# Patient Record
Sex: FEMALE | Race: BLACK OR AFRICAN AMERICAN | Employment: FULL TIME | URBAN - METROPOLITAN AREA
[De-identification: names, ages, dates, MRNs, and addresses within clinical notes are randomized per-mention and may not be internally consistent; named-entity substitution may affect disease eponyms.]

---

## 2019-04-18 ENCOUNTER — OFFICE VISIT (OUTPATIENT)
Dept: FAMILY MEDICINE CLINIC | Age: 44
End: 2019-04-18
Payer: COMMERCIAL

## 2019-04-18 VITALS
OXYGEN SATURATION: 100 % | BODY MASS INDEX: 41.59 KG/M2 | RESPIRATION RATE: 12 BRPM | HEART RATE: 85 BPM | HEIGHT: 66 IN | SYSTOLIC BLOOD PRESSURE: 134 MMHG | DIASTOLIC BLOOD PRESSURE: 74 MMHG | WEIGHT: 258.8 LBS

## 2019-04-18 DIAGNOSIS — J30.2 SEASONAL ALLERGIES: ICD-10-CM

## 2019-04-18 DIAGNOSIS — N92.0 MENORRHAGIA WITH REGULAR CYCLE: ICD-10-CM

## 2019-04-18 DIAGNOSIS — E66.01 CLASS 3 SEVERE OBESITY DUE TO EXCESS CALORIES WITHOUT SERIOUS COMORBIDITY WITH BODY MASS INDEX (BMI) OF 40.0 TO 44.9 IN ADULT (HCC): ICD-10-CM

## 2019-04-18 DIAGNOSIS — E89.0 POST-SURGICAL HYPOTHYROIDISM: ICD-10-CM

## 2019-04-18 DIAGNOSIS — D50.0 IRON DEFICIENCY ANEMIA DUE TO CHRONIC BLOOD LOSS: ICD-10-CM

## 2019-04-18 DIAGNOSIS — Z00.00 HEALTHCARE MAINTENANCE: Primary | ICD-10-CM

## 2019-04-18 DIAGNOSIS — R06.83 SNORING: ICD-10-CM

## 2019-04-18 PROBLEM — D50.9 IRON DEFICIENCY ANEMIA: Status: ACTIVE | Noted: 2019-04-18

## 2019-04-18 PROCEDURE — 99386 PREV VISIT NEW AGE 40-64: CPT | Performed by: FAMILY MEDICINE

## 2019-04-18 RX ORDER — FLUTICASONE PROPIONATE 50 MCG
1 SPRAY, SUSPENSION (ML) NASAL
COMMUNITY

## 2019-04-18 RX ORDER — CETIRIZINE HYDROCHLORIDE 10 MG/1
10 TABLET ORAL
COMMUNITY

## 2019-04-18 RX ORDER — LEVOTHYROXINE SODIUM 175 UG/1
TABLET ORAL
Refills: 2 | COMMUNITY
Start: 2019-04-02

## 2019-04-18 ASSESSMENT — PATIENT HEALTH QUESTIONNAIRE - PHQ9
SUM OF ALL RESPONSES TO PHQ QUESTIONS 1-9: 0
2. FEELING DOWN, DEPRESSED OR HOPELESS: 0
SUM OF ALL RESPONSES TO PHQ QUESTIONS 1-9: 0
SUM OF ALL RESPONSES TO PHQ9 QUESTIONS 1 & 2: 0
1. LITTLE INTEREST OR PLEASURE IN DOING THINGS: 0

## 2019-04-18 NOTE — PROGRESS NOTES
Phyllistine So   YOB: 1975    Date of Visit:  4/18/2019        Allergies   Allergen Reactions    Penicillins Rash     Outpatient Medications Marked as Taking for the 4/18/19 encounter (Office Visit) with Jerod Nava MD   Medication Sig Dispense Refill    fluticasone (FLONASE) 50 MCG/ACT nasal spray 1 spray by Nasal route      levothyroxine (SYNTHROID) 175 MCG tablet TK 1 T PO  ON MONDAY THROUGH SATURDAY AND 1/2 T ON SUNDAYS  2    cetirizine (ZYRTEC) 10 MG tablet Take 10 mg by mouth      Ferrous Sulfate (SLOW FE PO) Take by mouth           Vitals:    04/18/19 0935   BP: 134/74   Site: Left Upper Arm   Position: Sitting   Cuff Size: Large Adult   Pulse: 85   Resp: 12   SpO2: 100%   Weight: 258 lb 12.8 oz (117.4 kg)   Height: 5' 6\" (1.676 m)     Body mass index is 41.77 kg/m². Wt Readings from Last 3 Encounters:   04/18/19 258 lb 12.8 oz (117.4 kg)     BP Readings from Last 3 Encounters:   04/18/19 134/74        Chief Complaint   Patient presents with    New Patient     est care     Annual Exam     not fasting        HPI    Edmund Chance presents to establish care. she has the following concerns today:    History of pre-cancer of her thyroid gland status post thyroidectomy 2012 with hypothyroidism: The patient has been stable on levothyroxine. Seeing endocrinology who manages this. Menorrhagia:  Seeing GYN. Has a uterine polyp and fibroid which are thought to be related to her bleeding. Planning to have polyp removed May 30. Dr. Xavier Mcnulty will do the surgery. Has progesterone to take prn when traveling and periods are heavy. Seasonal allergies: on zyrtec and flonase. Iron deficiency anemia: on iron. Managed by GYN. Thought 2/2 to periods. No rectal bleeding or other bleeding. Obesity:  Weight has fluctuated with her thyroid. Higher now then usual.     Tdap done in 2015. HM: Seeing GYN. S/p tubal ligation.      SH:  4/2019: Lives with  and 2 kids ages 6 and 13 - normal. Tympanic membrane is not injected and not bulging. Left Ear: External ear normal. Tympanic membrane is not injected and not bulging. Nose: Nose normal.   Mouth/Throat: Oropharynx is clear and moist and mucous membranes are normal. No oropharyngeal exudate. Eyes: Pupils are equal, round, and reactive to light. Lids are normal. Right eye exhibits no discharge. Left eye exhibits no discharge. Neck: Normal range of motion. Neck supple. No tracheal deviation present. No thyromegaly present. Cardiovascular: Normal rate, regular rhythm and normal heart sounds. No murmur heard. Pulmonary/Chest: Effort normal and breath sounds normal. No respiratory distress. Abdominal: Soft. Bowel sounds are normal. She exhibits no distension. There is no hepatosplenomegaly. There is no tenderness. Musculoskeletal: She exhibits no edema. Normal gait, normal muscle tone   Lymphadenopathy:     She has no cervical adenopathy. Neurological: She is alert. No cranial nerve deficit. Skin: Skin is warm and dry. No rash noted. Psychiatric: She has a normal mood and affect. Her behavior is normal. Judgment normal.         Assessment/Plan     1. Post-surgical hypothyroidism  Stable. Continue current regimen. Monitored by her endo. 2. Menorrhagia with regular cycle  Stable. Continue current regimen. Monitored by GYN. 3. Class 3 severe obesity due to excess calories without serious comorbidity with body mass index (BMI) of 40.0 to 44.9 in adult (HCC)  Stable. Counseled on lifestyle modifications including diet and exercise. 4. Iron deficiency anemia due to chronic blood loss  Stable. Continue current regimen. Per patient monitored by GYN - including CBC. 5. Seasonal allergies  Stable. Continue current regimen. 6. Healthcare maintenance  Annual physical exam done today. Counseled on preventative care and a healthy lifestlye. - Lipid Panel; Future  - Basic Metabolic Panel; Future    7. Snoring  Referral for sleep eval.   - Sarah Burks MD, Pul[facility], [facility]      Discussed medications with patient, who voiced understanding of their use and indications. All questions answered. Return in about 1 year (around 4/18/2020) for Physical or preop when needed. Kyaw Cardona

## 2019-04-19 DIAGNOSIS — Z00.00 HEALTHCARE MAINTENANCE: ICD-10-CM

## 2019-04-19 LAB
ANION GAP SERPL CALCULATED.3IONS-SCNC: 13 MMOL/L (ref 3–16)
BUN BLDV-MCNC: 13 MG/DL (ref 7–20)
CALCIUM SERPL-MCNC: 9.2 MG/DL (ref 8.3–10.6)
CHLORIDE BLD-SCNC: 106 MMOL/L (ref 99–110)
CHOLESTEROL, TOTAL: 147 MG/DL (ref 0–199)
CO2: 24 MMOL/L (ref 21–32)
CREAT SERPL-MCNC: 0.9 MG/DL (ref 0.6–1.1)
GFR AFRICAN AMERICAN: >60
GFR NON-AFRICAN AMERICAN: >60
GLUCOSE BLD-MCNC: 108 MG/DL (ref 70–99)
HDLC SERPL-MCNC: 47 MG/DL (ref 40–60)
LDL CHOLESTEROL CALCULATED: 84 MG/DL
POTASSIUM SERPL-SCNC: 4.6 MMOL/L (ref 3.5–5.1)
SODIUM BLD-SCNC: 143 MMOL/L (ref 136–145)
TRIGL SERPL-MCNC: 82 MG/DL (ref 0–150)
VLDLC SERPL CALC-MCNC: 16 MG/DL

## 2019-05-15 ENCOUNTER — TELEPHONE (OUTPATIENT)
Dept: FAMILY MEDICINE CLINIC | Age: 44
End: 2019-05-15

## 2019-05-16 ENCOUNTER — TELEPHONE (OUTPATIENT)
Dept: FAMILY MEDICINE CLINIC | Age: 44
End: 2019-05-16

## 2019-05-16 NOTE — TELEPHONE ENCOUNTER
No show letter was sent by accident for patient, Marcianne Meckel called pt and ask her to please disregard, comment added to letter also to disregard

## 2019-05-17 ENCOUNTER — OFFICE VISIT (OUTPATIENT)
Dept: FAMILY MEDICINE CLINIC | Age: 44
End: 2019-05-17
Payer: COMMERCIAL

## 2019-05-17 VITALS
SYSTOLIC BLOOD PRESSURE: 114 MMHG | OXYGEN SATURATION: 99 % | HEART RATE: 74 BPM | WEIGHT: 255.6 LBS | HEIGHT: 66 IN | DIASTOLIC BLOOD PRESSURE: 72 MMHG | BODY MASS INDEX: 41.08 KG/M2

## 2019-05-17 DIAGNOSIS — Z01.818 PRE-OPERATIVE GENERAL PHYSICAL EXAMINATION: ICD-10-CM

## 2019-05-17 DIAGNOSIS — N92.0 MENORRHAGIA WITH REGULAR CYCLE: ICD-10-CM

## 2019-05-17 DIAGNOSIS — R60.0 LOWER EXTREMITY EDEMA: ICD-10-CM

## 2019-05-17 DIAGNOSIS — R60.0 LOWER EXTREMITY EDEMA: Primary | ICD-10-CM

## 2019-05-17 LAB
ANION GAP SERPL CALCULATED.3IONS-SCNC: 12 MMOL/L (ref 3–16)
BUN BLDV-MCNC: 10 MG/DL (ref 7–20)
CALCIUM SERPL-MCNC: 9.1 MG/DL (ref 8.3–10.6)
CHLORIDE BLD-SCNC: 106 MMOL/L (ref 99–110)
CO2: 23 MMOL/L (ref 21–32)
CREAT SERPL-MCNC: 1 MG/DL (ref 0.6–1.1)
GFR AFRICAN AMERICAN: >60
GFR NON-AFRICAN AMERICAN: >60
GLUCOSE BLD-MCNC: 103 MG/DL (ref 70–99)
POTASSIUM SERPL-SCNC: 3.9 MMOL/L (ref 3.5–5.1)
PRO-BNP: 65 PG/ML (ref 0–124)
SODIUM BLD-SCNC: 141 MMOL/L (ref 136–145)

## 2019-05-17 PROCEDURE — 99243 OFF/OP CNSLTJ NEW/EST LOW 30: CPT | Performed by: NURSE PRACTITIONER

## 2019-05-17 PROCEDURE — G8417 CALC BMI ABV UP PARAM F/U: HCPCS | Performed by: NURSE PRACTITIONER

## 2019-05-17 PROCEDURE — G8427 DOCREV CUR MEDS BY ELIG CLIN: HCPCS | Performed by: NURSE PRACTITIONER

## 2019-05-17 ASSESSMENT — ENCOUNTER SYMPTOMS
SHORTNESS OF BREATH: 0
VOMITING: 0
NAUSEA: 0
DIARRHEA: 0
TROUBLE SWALLOWING: 0
CONSTIPATION: 0
RHINORRHEA: 0
ABDOMINAL PAIN: 0
COUGH: 1

## 2019-05-17 NOTE — PROGRESS NOTES
Preoperative Consultation    Gasper Hoskins  YOB: 1975    This patient presents to the office today for a preoperative consultation at the request of surgeon, , who plans on performing a hysteroscopy, dilation and curettage, polypectomy and possible myomectomy    Polyp  Found in October  Not growing  Not believed to be cancerous    Heavy menses  Worse in the last year  D&C to be completed    Edema  Bilateral fee/ legs  Just got back from [facility]- was there for 3.5 days  No calf tenderness  Wears DB hose when she travels, forgot on the way back    Patient Active Problem List   Diagnosis    Post-surgical hypothyroidism    Menorrhagia    Class 3 severe obesity due to excess calories without serious comorbidity with body mass index (BMI) of 40.0 to 44.9 in [facility])    '    Seasonal allergies     Past Surgical History:   Procedure Laterality Date    BREAST REDUCTION SURGERY       SECTION  ,     CHOLECYSTECTOMY      ENDOMETRIAL ABLATION      THYROIDECTOMY, COMPLETION         Allergies   Allergen Reactions    Penicillins Rash     Outpatient Medications Marked as Taking for the 19 encounter (Office Visit) with ALONDRA Ocampo CNP   Medication Sig Dispense Refill    fluticasone (FLONASE) 50 MCG/ACT nasal spray 1 spray by Nasal route      levothyroxine (SYNTHROID) 175 MCG tablet TK 1 T PO  ON MONDAY THROUGH SATURDAY AND 1/2 T ON SUNDAYS  2    cetirizine (ZYRTEC) 10 MG tablet Take 10 mg by mouth         Social History     Tobacco Use    Smoking status: Never Smoker    Smokeless tobacco: Never Used   Substance Use Topics    Alcohol use: Yes     Comment: social      No family history on file. Review of Systems:  Review of Systems   Constitutional: Negative for activity change, appetite change and fever. HENT: Negative for congestion, rhinorrhea and trouble swallowing. Eyes: Negative for visual disturbance.    Respiratory: Positive for cough. Negative for shortness of breath. In the AM coughs up phelgm   Cardiovascular: Positive for leg swelling. Negative for chest pain. Gastrointestinal: Negative for abdominal pain, constipation, diarrhea, nausea and vomiting. Genitourinary: Positive for vaginal bleeding. Negative for dysuria. Skin: Negative for rash. Allergic/Immunologic: Positive for environmental allergies. Neurological: Negative for dizziness and headaches. Psychiatric/Behavioral: Negative for sleep disturbance. Objective:     /72 (Site: Right Upper Arm, Position: Sitting, Cuff Size: Large Adult)   Pulse 74   Ht 5' 6\" (1.676 m)   Wt 255 lb 9.6 oz (115.9 kg)   LMP 05/10/2019   SpO2 99%   BMI 41.25 kg/m²  Weight: 255 lb 9.6 oz (115.9 kg)   Physical Exam   Constitutional: She appears well-developed and well-nourished. HENT:   Head: Normocephalic. Right Ear: Tympanic membrane, external ear and ear canal normal.   Left Ear: Tympanic membrane, external ear and ear canal normal.   Nose: Nose normal.   Mouth/Throat: Uvula is midline, oropharynx is clear and moist and mucous membranes are normal.   Cardiovascular: Normal rate, regular rhythm, normal heart sounds, intact distal pulses and normal pulses. Lower extremity edema especially in the feet, no calf tenderness   Pulmonary/Chest: Effort normal and breath sounds normal.   Abdominal: Soft. There is no tenderness. Lymphadenopathy:     She has no cervical adenopathy. Psychiatric: She has a normal mood and affect. Lab Review BMP and BNP ordered d/t lower extremity edema on exam       Assessment:       1. Pre-operative general physical examination  Stable; Patient cleared for planned procedure as long as lab work WNL. Patient v/u.    2. Lower extremity edema  Stable; Discussed elevation and use of DB hose. Avoid salt. - BRAIN NATRIURETIC PEPTIDE (BNP); Future  - Basic Metabolic Panel; Future    3.  Menorrhagia with regular cycle  Stable; See #1     40 y.o. patient approved for Surgery         Plan:     1. Preoperative workup as follows: none  2. Change in medication regimen before surgery: Follow recommendations from surgeon.   3. No contraindications to planned surgery    Silverio Haas, ALONDRA - CNP

## 2019-05-29 ENCOUNTER — TELEPHONE (OUTPATIENT)
Dept: FAMILY MEDICINE CLINIC | Age: 44
End: 2019-05-29

## 2019-06-06 ENCOUNTER — TELEPHONE (OUTPATIENT)
Dept: FAMILY MEDICINE CLINIC | Age: 44
End: 2019-06-06

## 2023-03-03 ENCOUNTER — OFFICE VISIT (OUTPATIENT)
Dept: FAMILY MEDICINE CLINIC | Age: 48
End: 2023-03-03
Payer: COMMERCIAL

## 2023-03-03 VITALS
HEART RATE: 94 BPM | HEIGHT: 66 IN | BODY MASS INDEX: 35.84 KG/M2 | OXYGEN SATURATION: 98 % | DIASTOLIC BLOOD PRESSURE: 72 MMHG | WEIGHT: 223 LBS | TEMPERATURE: 97.8 F | SYSTOLIC BLOOD PRESSURE: 118 MMHG

## 2023-03-03 DIAGNOSIS — Z85.850 HISTORY OF THYROID CANCER: ICD-10-CM

## 2023-03-03 DIAGNOSIS — Z82.49 FAMILY HISTORY OF EARLY CAD: ICD-10-CM

## 2023-03-03 DIAGNOSIS — Z98.890 HX OF CERVICAL POLYPECTOMY: ICD-10-CM

## 2023-03-03 DIAGNOSIS — Z87.42 HX OF CERVICAL POLYPECTOMY: ICD-10-CM

## 2023-03-03 DIAGNOSIS — E78.41 ELEVATED LIPOPROTEIN A LEVEL: ICD-10-CM

## 2023-03-03 DIAGNOSIS — Z86.2 HISTORY OF ANEMIA: ICD-10-CM

## 2023-03-03 DIAGNOSIS — D25.9 UTERINE LEIOMYOMA, UNSPECIFIED LOCATION: ICD-10-CM

## 2023-03-03 DIAGNOSIS — Z00.00 ENCOUNTER FOR WELL ADULT EXAM WITHOUT ABNORMAL FINDINGS: Primary | ICD-10-CM

## 2023-03-03 PROCEDURE — 99386 PREV VISIT NEW AGE 40-64: CPT | Performed by: FAMILY MEDICINE

## 2023-03-03 PROCEDURE — G8484 FLU IMMUNIZE NO ADMIN: HCPCS | Performed by: FAMILY MEDICINE

## 2023-03-03 RX ORDER — LEVOTHYROXINE SODIUM 0.12 MG/1
TABLET ORAL
COMMUNITY
Start: 2023-02-14

## 2023-03-03 ASSESSMENT — PATIENT HEALTH QUESTIONNAIRE - PHQ9
SUM OF ALL RESPONSES TO PHQ QUESTIONS 1-9: 0
SUM OF ALL RESPONSES TO PHQ9 QUESTIONS 1 & 2: 0
SUM OF ALL RESPONSES TO PHQ QUESTIONS 1-9: 0
2. FEELING DOWN, DEPRESSED OR HOPELESS: 0
1. LITTLE INTEREST OR PLEASURE IN DOING THINGS: 0

## 2023-03-03 NOTE — PROGRESS NOTES
Marixa Moreno   YOB: 1975    Date of Visit:  3/3/2023        Allergies   Allergen Reactions    Penicillins Rash     Outpatient Medications Marked as Taking for the 3/3/23 encounter (Office Visit) with Danelle Lee MD   Medication Sig Dispense Refill    Ketotifen Fumarate (ALLERGY EYE DROPS OP) Apply to eye      fluticasone (FLONASE) 50 MCG/ACT nasal spray 1 spray by Nasal route      cetirizine (ZYRTEC) 10 MG tablet Take 10 mg by mouth      Ferrous Sulfate (SLOW FE PO) Take by mouth           Vitals:    03/03/23 0759 03/03/23 0809   BP: 118/72    Site: Right Upper Arm    Position: Sitting    Cuff Size: Small Adult    Pulse: 94    Temp: 97.8 °F (36.6 °C)    TempSrc: Temporal    SpO2: 98%    Weight:  223 lb (101.2 kg)   Height: 5' 6\" (1.676 m)      Body mass index is 35.99 kg/m². Wt Readings from Last 3 Encounters:   03/03/23 223 lb (101.2 kg)   05/17/19 255 lb 9.6 oz (115.9 kg)   04/18/19 258 lb 12.8 oz (117.4 kg)     BP Readings from Last 3 Encounters:   03/03/23 118/72   05/17/19 114/72   04/18/19 134/74        Chief Complaint   Patient presents with    Annual Exam       HPI    Emiliana Rivera presents to re-establish care. she has the following concerns today:    Insomnia: sees Dr. Brigitte Ibarra with functional endocrinology for assistance with weight loss who has addressed this issue. Elevated lipoprotein A:  Managed by endocrinology. Has been addressed by Dr. Brigitte Ibarra and Dr. Keisha Keyes. Done given family history of CAD- dad has had CABG twice- first age 51-58. History of pre-cancer of her thyroid gland status post thyroidectomy 2012 with hypothyroidism: The patient has been stable on levothyroxine. Seeing endocrinology who manages this. Hx of Menorrhagia now with lighter periods:  Seeing GYN. Has a uterine polyp and fibroid which are thought to be related to her bleeding. Hx of cervical polyp removed. Seasonal allergies: on zyrtec and flonase. Iron deficiency anemia: on iron. Managed by GYN. Thought 2/ to periods. No rectal bleeding or other bleeding. Obesity:  Has been doing [employer] weight loss plan with Dr. Michael Hernandez. Talked to Dr. Michael Hernandez weekly for the [address] Goal is 180. Started 2022 with the diet. Tdap done in . HM: Seeing GYN. S/p tubal ligation. SH:  3/2023:  Son is on college.  at [facility] 2019: Lives with  and 2 kids ages 6 and 13 - son and daughter.      Past Medical History:   Diagnosis Date    Elevated lipoprotein A level     History of thyroid cancer     Hx of cervical polypectomy     Post-surgical hypothyroidism     Uterine fibroid        Past Surgical History:   Procedure Laterality Date    BREAST REDUCTION SURGERY  2010    CERVICAL POLYP REMOVAL       SECTION  ,     CHOLECYSTECTOMY  2004    ENDOMETRIAL ABLATION      THYROIDECTOMY, COMPLETION         Social History     Socioeconomic History    Marital status:      Spouse name: Not on file    Number of children: Not on file    Years of education: Not on file    Highest education level: Not on file   Occupational History    Not on file   Tobacco Use    Smoking status: Never    Smokeless tobacco: Never   Vaping Use    Vaping Use: Never used   Substance and Sexual Activity    Alcohol use: Yes     Comment: social     Drug use: Never    Sexual activity: Not on file   Other Topics Concern    Not on file   Social History Narrative    Not on file     Social Determinants of Health     Financial Resource Strain: Not on file   Food Insecurity: Not on file   Transportation Needs: Not on file   Physical Activity: Not on file   Stress: Not on file   Social Connections: Not on file   Intimate Partner Violence: Not on file   Housing Stability: Not on file       Family History   Problem Relation Age of Onset    Hypertension Mother     Other Mother         sarcoidosis    High Cholesterol Father     Hypertension Father     Coronary Art Dis Father     No Known Problems Brother          Review of Systems  Complete review of systems negative except as documented in the HPI. Physical Exam  Constitutional:       General: She is not in acute distress. Appearance: She is well-developed. HENT:      Head: Atraumatic. Right Ear: External ear normal. Tympanic membrane is not injected or bulging. Left Ear: External ear normal. Tympanic membrane is not injected or bulging. Nose: Nose normal.      Mouth/Throat:      Pharynx: No oropharyngeal exudate. Eyes:      General: Lids are normal.         Right eye: No discharge. Left eye: No discharge. Pupils: Pupils are equal, round, and reactive to light. Neck:      Thyroid: No thyromegaly. Trachea: No tracheal deviation. Cardiovascular:      Rate and Rhythm: Normal rate and regular rhythm. Heart sounds: Normal heart sounds. No murmur heard. Pulmonary:      Effort: Pulmonary effort is normal. No respiratory distress. Breath sounds: Normal breath sounds. Abdominal:      General: Bowel sounds are normal. There is no distension. Palpations: Abdomen is soft. Tenderness: There is no abdominal tenderness. Musculoskeletal:      Cervical back: Normal range of motion and neck supple. Comments: Normal gait, normal muscle tone   Lymphadenopathy:      Cervical: No cervical adenopathy. Skin:     General: Skin is warm and dry. Findings: No rash. Neurological:      Mental Status: She is alert. Cranial Nerves: No cranial nerve deficit. Psychiatric:         Behavior: Behavior normal.         Judgment: Judgment normal.         Assessment/Plan   1. Encounter for well adult exam without abnormal findings  Annual physical exam done today. Counseled on preventative care and a healthy lifestlye. - Hepatitis C Antibody; Future  - Fecal DNA Colorectal cancer screening (Cologuard)    2. Family history of early CAD  Stable. Asymptomatic. Seeing endo.   Can consider Cardiac calcium CT - discussed may consider a Statin if elevated. - CT CARDIAC CALCIUM SCORING; Future  - Lipid Panel; Future    3. Elevated lipoprotein A level  See #2.   - Lipid Panel; Future    4. History of thyroid cancer  Stable. Continue current regimen. Seeing endo. 5. Hx of cervical polypectomy  Stable. Continue current regimen. Seeing GYN. 6. Uterine leiomyoma, unspecified location  Stable. Continue current regimen. Seeing GYN. 7. History of anemia  Recheck. - CBC with Auto Differential; Future      Discussed medications with patient, who voiced understanding of their use and indications. All questions answered.     Return in about 1 year (around 3/3/2024) for Physical.

## 2023-03-03 NOTE — PATIENT INSTRUCTIONS
Well Visit, Ages 25 to 72: Care Instructions  Well visits can help you stay healthy. Your doctor has checked your overall health and may have suggested ways to take good care of yourself. Your doctor also may have recommended tests. You can help prevent illness with healthy eating, good sleep, vaccinations, regular exercise, and other steps. Get the tests that you and your doctor decide on. Depending on your age and risks, examples might include screening for diabetes; hepatitis C; HIV; and cervical, breast, lung, and colon cancer. Screening helps find diseases before any symptoms appear. Eat healthy foods. Choose fruits, vegetables, whole grains, lean protein, and low-fat dairy foods. Limit saturated fat and reduce salt. Limit alcohol. Men should have no more than 2 drinks a day. Women should have no more than 1. For some people, no alcohol is the best choice. Exercise. Get at least 30 minutes of exercise on most days of the week. Walking can be a good choice. Reach and stay at your healthy weight. This will lower your risk for many health problems. Take care of your mental health. Try to stay connected with friends, family, and community, and find ways to manage stress. If you're feeling depressed or hopeless, talk to someone. A counselor can help. If you don't have a counselor, talk to your doctor. Talk to your doctor if you think you may have a problem with alcohol or drug use. This includes prescription medicines and illegal drugs. Avoid tobacco and nicotine: Don't smoke, vape, or chew. If you need help quitting, talk to your doctor. Practice safer sex. Getting tested, using condoms or dental dams, and limiting sex partners can help prevent STIs. Use birth control if it's important to you to prevent pregnancy. Talk with your doctor about your choices and what might be best for you. Prevent problems where you can.  Protect your skin from too much sun, wash your hands, brush your teeth twice a day, and wear a seat belt in the car. Where can you learn more? Go to http://www.[facility].com/ and enter P072 to learn more about \"Well Visit, Ages 25 to 72: Care Instructions. \"  Current as of: March 9, 2022               Content Version: 13.5  © 1318-5135 [employer]. Care instructions adapted under license by [facility] ([facility]). If you have questions about a medical condition or this instruction, always ask your healthcare professional. [address] any warranty or liability for your use of this information.

## 2023-12-28 ENCOUNTER — OFFICE VISIT (OUTPATIENT)
Dept: FAMILY MEDICINE CLINIC | Age: 48
End: 2023-12-28
Payer: COMMERCIAL

## 2023-12-28 VITALS
HEART RATE: 98 BPM | OXYGEN SATURATION: 98 % | SYSTOLIC BLOOD PRESSURE: 122 MMHG | BODY MASS INDEX: 35.99 KG/M2 | DIASTOLIC BLOOD PRESSURE: 80 MMHG | HEIGHT: 66 IN

## 2023-12-28 DIAGNOSIS — E89.0 POST-SURGICAL HYPOTHYROIDISM: ICD-10-CM

## 2023-12-28 DIAGNOSIS — E78.41 ELEVATED LIPOPROTEIN A LEVEL: ICD-10-CM

## 2023-12-28 DIAGNOSIS — R03.0 ELEVATED BLOOD PRESSURE READING: Primary | ICD-10-CM

## 2023-12-28 DIAGNOSIS — Z85.850 HISTORY OF THYROID CANCER: ICD-10-CM

## 2023-12-28 PROCEDURE — G8417 CALC BMI ABV UP PARAM F/U: HCPCS | Performed by: FAMILY MEDICINE

## 2023-12-28 PROCEDURE — 90715 TDAP VACCINE 7 YRS/> IM: CPT | Performed by: FAMILY MEDICINE

## 2023-12-28 PROCEDURE — G8427 DOCREV CUR MEDS BY ELIG CLIN: HCPCS | Performed by: FAMILY MEDICINE

## 2023-12-28 PROCEDURE — 1036F TOBACCO NON-USER: CPT | Performed by: FAMILY MEDICINE

## 2023-12-28 PROCEDURE — 99214 OFFICE O/P EST MOD 30 MIN: CPT | Performed by: FAMILY MEDICINE

## 2023-12-28 PROCEDURE — 90471 IMMUNIZATION ADMIN: CPT | Performed by: FAMILY MEDICINE

## 2023-12-28 PROCEDURE — G8484 FLU IMMUNIZE NO ADMIN: HCPCS | Performed by: FAMILY MEDICINE

## 2023-12-28 RX ORDER — ROSUVASTATIN CALCIUM 5 MG/1
10 TABLET, COATED ORAL DAILY
COMMUNITY
Start: 2023-11-25